# Patient Record
Sex: FEMALE | Race: WHITE | NOT HISPANIC OR LATINO | Employment: STUDENT | URBAN - METROPOLITAN AREA
[De-identification: names, ages, dates, MRNs, and addresses within clinical notes are randomized per-mention and may not be internally consistent; named-entity substitution may affect disease eponyms.]

---
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
## 2022-02-23 ENCOUNTER — OFFICE VISIT (OUTPATIENT)
Dept: GASTROENTEROLOGY | Facility: CLINIC | Age: 22
End: 2022-02-23
Payer: COMMERCIAL

## 2022-02-23 ENCOUNTER — TELEPHONE (OUTPATIENT)
Dept: GASTROENTEROLOGY | Facility: CLINIC | Age: 22
End: 2022-02-23

## 2022-02-23 VITALS
SYSTOLIC BLOOD PRESSURE: 108 MMHG | DIASTOLIC BLOOD PRESSURE: 74 MMHG | WEIGHT: 135.2 LBS | HEIGHT: 61 IN | BODY MASS INDEX: 25.53 KG/M2

## 2022-02-23 DIAGNOSIS — R10.32 LEFT LOWER QUADRANT ABDOMINAL PAIN: ICD-10-CM

## 2022-02-23 DIAGNOSIS — R11.0 NAUSEA: Primary | ICD-10-CM

## 2022-02-23 PROCEDURE — 99204 OFFICE O/P NEW MOD 45 MIN: CPT | Performed by: INTERNAL MEDICINE

## 2022-02-23 RX ORDER — BUPROPION HYDROCHLORIDE 150 MG/1
TABLET ORAL
COMMUNITY
Start: 2022-02-16

## 2022-02-23 RX ORDER — LEVONORGESTREL AND ETHINYL ESTRADIOL 0.1-0.02MG
KIT ORAL
COMMUNITY
Start: 2022-01-28

## 2022-02-23 RX ORDER — NAPROXEN 500 MG/1
500 TABLET ORAL 2 TIMES DAILY PRN
COMMUNITY
Start: 2021-12-31

## 2022-02-23 RX ORDER — ONDANSETRON 4 MG/1
4 TABLET, ORALLY DISINTEGRATING ORAL EVERY 6 HOURS PRN
Qty: 30 TABLET | Refills: 5 | Status: SHIPPED | OUTPATIENT
Start: 2022-02-23

## 2022-02-23 RX ORDER — DEXTROAMPHETAMINE SACCHARATE, AMPHETAMINE ASPARTATE MONOHYDRATE, DEXTROAMPHETAMINE SULFATE AND AMPHETAMINE SULFATE 6.25; 6.25; 6.25; 6.25 MG/1; MG/1; MG/1; MG/1
25 CAPSULE, EXTENDED RELEASE ORAL EVERY MORNING
COMMUNITY

## 2022-02-23 RX ORDER — ONDANSETRON 4 MG/1
TABLET, ORALLY DISINTEGRATING ORAL
COMMUNITY
Start: 2021-12-31 | End: 2022-02-23 | Stop reason: SDUPTHER

## 2022-02-23 NOTE — H&P (VIEW-ONLY)
[facility] - Outpatient Consultation  Arely Garcia 20 y o  female MRN: [id number]  Encounter: [id number]    ASSESSMENT AND PLAN:      1  Left lower quadrant abdominal pain  Bouts of abdominal pain began about 18 months ago  No food triggers  No associated change in bowel habits  No relief with dicyclomine  Had appendectomy and lysis of adhesions in December, now pain is more left-sided  No etiology on CT    Discussed treatment options  Plan colonoscopy to assess for IBD  Differential also includes functional abdominal pain or endometriosis  Continue dicyclomine as needed preprocedure    2  Nausea  Longstanding complaint  May be related to POTS  Good control with Zofran when she received after appendectomy, refill provided      Followup Appointment:  Pending colonoscopy  ______________________________________________________________________    Chief Complaint   Patient presents with    Abdominal Pain       HPI:   Arely Garcia is a 20y o  year old female who presents for consultation regarding abdominal pain  Symptoms began about 18 months ago  Symptoms are severe and lasted a few weeks  She was seen in urgent care and was diagnosed with IBS  She was provided dicyclomine as needed provided little relief  Symptoms improved within a few weeks  Symptoms recurred in December  They are more localized to the right side  CT showed appendicoliths  She underwent appendectomy and lysis of adhesions  She recovered from the procedure that about 2 weeks later she developed severe left-sided abdominal pain   She was seen in the ER twice and and was referred to GI where she lives in [place]  Symptoms improved in about 2 weeks with supportive care  Colonoscopy was planned but rescheduled multiple times due to insurance issues  Symptoms are starting to recur on the left side  The abdomen flow throughout the day  There are no specific food triggers    Stools are generally normal with occasional constipation if she is not well hydrated  She denies any rectal bleeding or mucus  The pain occasionally radiates to the left flank  She denies any urinary complaints  Her weight is generally stable    Historical Information   Past Medical History:   Diagnosis Date    ADHD     Amplified musculoskeletal pain syndrome     Anxiety     Depression     Migraines     POTS (postural orthostatic tachycardia syndrome)      Past Surgical History:   Procedure Laterality Date    APPENDECTOMY      TONSILECTOMY AND ADNOIDECTOMY      WISDOM TOOTH EXTRACTION       Social History     Substance and Sexual Activity   Alcohol Use Yes    Comment: occasionally     Social History     Substance and Sexual Activity   Drug Use Not on file     Social History     Tobacco Use   Smoking Status Never Smoker   Smokeless Tobacco Never Used     Family History   Problem Relation Age of Onset    Fibromyalgia Mother     Hypertension Mother     Ulcerative colitis Mother    Kimberly Congress Migraines Mother     Depression Mother     Colon cancer Mother     Ulcers Brother     No Known Problems Brother        Meds/Allergies     Current Outpatient Medications:     amphetamine-dextroamphetamine (ADDERALL XR) 25 MG 24 hr capsule    buPROPion (WELLBUTRIN XL) 150 mg 24 hr tablet    naproxen (NAPROSYN) 500 mg tablet    ondansetron (ZOFRAN-ODT) 4 mg disintegrating tablet    Sronyx 0 1-20 MG-MCG per tablet    Allergies   Allergen Reactions    Codeine Swelling       PHYSICAL EXAM:    Blood pressure 108/74, height 5' 1" (1 549 m), weight 61 3 kg (135 lb 3 2 oz)  Body mass index is 25 55 kg/m²  General Appearance: NAD, cooperative, alert  Eyes: Anicteric, PERRLA, EOMI  ENT:  Normocephalic, atraumatic, normal mucosa      Neck:  Supple, symmetrical, trachea midline,   Resp:  Clear to auscultation bilaterally; no rales, rhonchi or wheezing; respirations unlabored   CV:  S1 S2, Regular rate and rhythm; no murmur, rub, or gallop  GI:  Soft, non-tender, non-distended; normal bowel sounds; no masses, no organomegaly   Rectal: Deferred  Musculoskeletal: No cyanosis, clubbing or edema  Normal ROM  Skin:  No jaundice, rashes, or lesions   Heme/Lymph: No palpable cervical lymphadenopathy  Psych: Normal affect, good eye contact  Neuro: No gross deficits, AAOx3    Lab Results:   No results found for: WBC, HGB, HCT, MCV, PLT  No results found for: NA, K, CL, CO2, ANIONGAP, BUN, CREATININE, GLUCOSE, GLUF, CALCIUM, CORRECTEDCA, AST, ALT, ALKPHOS, PROT, BILITOT, EGFR  No results found for: IRON, TIBC, FERRITIN  No results found for: LIPASE    Radiology Results:   No results found  REVIEW OF SYSTEMS:    CONSTITUTIONAL: Denies any fever, chills, rigors, and weight loss  HEENT: No earache or tinnitus  Denies hearing loss or visual disturbances  CARDIOVASCULAR: No chest pain or palpitations  RESPIRATORY: Denies any cough, hemoptysis, shortness of breath or dyspnea on exertion  GASTROINTESTINAL: As noted in the History of Present Illness  GENITOURINARY: No problems with urination  Denies any hematuria or dysuria  NEUROLOGIC: No dizziness or vertigo, denies headaches  MUSCULOSKELETAL: Denies any muscle or joint pain  SKIN: Denies skin rashes or itching  ENDOCRINE: Denies excessive thirst  Denies intolerance to heat or cold  PSYCHOSOCIAL: Denies depression or anxiety  Denies any recent memory loss

## 2022-02-23 NOTE — PROGRESS NOTES
[facility] - Outpatient Consultation  Isabelle Garcia 20 y o  female MRN: [id number]  Encounter: [id number]    ASSESSMENT AND PLAN:      1  Left lower quadrant abdominal pain  Bouts of abdominal pain began about 18 months ago  No food triggers  No associated change in bowel habits  No relief with dicyclomine  Had appendectomy and lysis of adhesions in December, now pain is more left-sided  No etiology on CT    Discussed treatment options  Plan colonoscopy to assess for IBD  Differential also includes functional abdominal pain or endometriosis  Continue dicyclomine as needed preprocedure    2  Nausea  Longstanding complaint  May be related to POTS  Good control with Zofran when she received after appendectomy, refill provided      Followup Appointment:  Pending colonoscopy  ______________________________________________________________________    Chief Complaint   Patient presents with    Abdominal Pain       HPI:   Isabelle Garcia is a 20y o  year old female who presents for consultation regarding abdominal pain  Symptoms began about 18 months ago  Symptoms are severe and lasted a few weeks  She was seen in urgent care and was diagnosed with IBS  She was provided dicyclomine as needed provided little relief  Symptoms improved within a few weeks  Symptoms recurred in December  They are more localized to the right side  CT showed appendicoliths  She underwent appendectomy and lysis of adhesions  She recovered from the procedure that about 2 weeks later she developed severe left-sided abdominal pain   She was seen in the ER twice and and was referred to GI where she lives in [facility]  Symptoms improved in about 2 weeks with supportive care  Colonoscopy was planned but rescheduled multiple times due to insurance issues  Symptoms are starting to recur on the left side  The abdomen flow throughout the day  There are no specific food triggers    Stools are generally normal with occasional constipation if she is not well hydrated  She denies any rectal bleeding or mucus  The pain occasionally radiates to the left flank  She denies any urinary complaints  Her weight is generally stable    Historical Information   Past Medical History:   Diagnosis Date    ADHD     Amplified musculoskeletal pain syndrome     Anxiety     Depression     Migraines     POTS (postural orthostatic tachycardia syndrome)      Past Surgical History:   Procedure Laterality Date    APPENDECTOMY      TONSILECTOMY AND ADNOIDECTOMY      WISDOM TOOTH EXTRACTION       Social History     Substance and Sexual Activity   Alcohol Use Yes    Comment: occasionally     Social History     Substance and Sexual Activity   Drug Use Not on file     Social History     Tobacco Use   Smoking Status Never Smoker   Smokeless Tobacco Never Used     Family History   Problem Relation Age of Onset    Fibromyalgia Mother     Hypertension Mother     Ulcerative colitis Mother    Leandro Cook Migraines Mother     Depression Mother     Colon cancer Mother     Ulcers Brother     No Known Problems Brother        Meds/Allergies     Current Outpatient Medications:     amphetamine-dextroamphetamine (ADDERALL XR) 25 MG 24 hr capsule    buPROPion (WELLBUTRIN XL) 150 mg 24 hr tablet    naproxen (NAPROSYN) 500 mg tablet    ondansetron (ZOFRAN-ODT) 4 mg disintegrating tablet    Sronyx 0 1-20 MG-MCG per tablet    Allergies   Allergen Reactions    Codeine Swelling       PHYSICAL EXAM:    Blood pressure 108/74, height 5' 1" (1 549 m), weight 61 3 kg (135 lb 3 2 oz)  Body mass index is 25 55 kg/m²  General Appearance: NAD, cooperative, alert  Eyes: Anicteric, PERRLA, EOMI  ENT:  Normocephalic, atraumatic, normal mucosa      Neck:  Supple, symmetrical, trachea midline,   Resp:  Clear to auscultation bilaterally; no rales, rhonchi or wheezing; respirations unlabored   CV:  S1 S2, Regular rate and rhythm; no murmur, rub, or gallop  GI:  Soft, non-tender, non-distended; normal bowel sounds; no masses, no organomegaly   Rectal: Deferred  Musculoskeletal: No cyanosis, clubbing or edema  Normal ROM  Skin:  No jaundice, rashes, or lesions   Heme/Lymph: No palpable cervical lymphadenopathy  Psych: Normal affect, good eye contact  Neuro: No gross deficits, AAOx3    Lab Results:   No results found for: WBC, HGB, HCT, MCV, PLT  No results found for: NA, K, CL, CO2, ANIONGAP, BUN, CREATININE, GLUCOSE, GLUF, CALCIUM, CORRECTEDCA, AST, ALT, ALKPHOS, PROT, BILITOT, EGFR  No results found for: IRON, TIBC, FERRITIN  No results found for: LIPASE    Radiology Results:   No results found  REVIEW OF SYSTEMS:    CONSTITUTIONAL: Denies any fever, chills, rigors, and weight loss  HEENT: No earache or tinnitus  Denies hearing loss or visual disturbances  CARDIOVASCULAR: No chest pain or palpitations  RESPIRATORY: Denies any cough, hemoptysis, shortness of breath or dyspnea on exertion  GASTROINTESTINAL: As noted in the History of Present Illness  GENITOURINARY: No problems with urination  Denies any hematuria or dysuria  NEUROLOGIC: No dizziness or vertigo, denies headaches  MUSCULOSKELETAL: Denies any muscle or joint pain  SKIN: Denies skin rashes or itching  ENDOCRINE: Denies excessive thirst  Denies intolerance to heat or cold  PSYCHOSOCIAL: Denies depression or anxiety  Denies any recent memory loss

## 2022-02-23 NOTE — TELEPHONE ENCOUNTER
Scheduled date of colonoscopy (as of today): 03/08/2022  Physician performing colonoscopy: Ayaka Balderas  Location of colonoscopy: [facility] ([facility]  Bowel prep reviewed with patient: Miralax  Instructions reviewed with patient by: Toma Rashid  Clearances:

## 2022-03-08 ENCOUNTER — ANESTHESIA EVENT (OUTPATIENT)
Dept: GASTROENTEROLOGY | Facility: AMBULATORY SURGERY CENTER | Age: 22
End: 2022-03-08

## 2022-03-08 ENCOUNTER — ANESTHESIA (OUTPATIENT)
Dept: GASTROENTEROLOGY | Facility: AMBULATORY SURGERY CENTER | Age: 22
End: 2022-03-08

## 2022-03-08 ENCOUNTER — HOSPITAL ENCOUNTER (OUTPATIENT)
Dept: GASTROENTEROLOGY | Facility: AMBULATORY SURGERY CENTER | Age: 22
Discharge: HOME | End: 2022-03-08
Payer: COMMERCIAL

## 2022-03-08 VITALS
WEIGHT: 132 LBS | OXYGEN SATURATION: 95 % | TEMPERATURE: 97.3 F | HEIGHT: 61 IN | DIASTOLIC BLOOD PRESSURE: 58 MMHG | BODY MASS INDEX: 24.92 KG/M2 | HEART RATE: 85 BPM | RESPIRATION RATE: 23 BRPM | SYSTOLIC BLOOD PRESSURE: 102 MMHG

## 2022-03-08 DIAGNOSIS — R10.32 LEFT LOWER QUADRANT ABDOMINAL PAIN: ICD-10-CM

## 2022-03-08 LAB
EXT PREGNANCY TEST URINE: NEGATIVE
EXT. CONTROL: NORMAL

## 2022-03-08 PROCEDURE — 45385 COLONOSCOPY W/LESION REMOVAL: CPT | Performed by: INTERNAL MEDICINE

## 2022-03-08 PROCEDURE — 45380 COLONOSCOPY AND BIOPSY: CPT | Performed by: INTERNAL MEDICINE

## 2022-03-08 RX ORDER — SODIUM CHLORIDE, SODIUM LACTATE, POTASSIUM CHLORIDE, CALCIUM CHLORIDE 600; 310; 30; 20 MG/100ML; MG/100ML; MG/100ML; MG/100ML
50 INJECTION, SOLUTION INTRAVENOUS CONTINUOUS
Status: DISCONTINUED | OUTPATIENT
Start: 2022-03-08 | End: 2022-03-12 | Stop reason: HOSPADM

## 2022-03-08 RX ORDER — PROPOFOL 10 MG/ML
INJECTION, EMULSION INTRAVENOUS AS NEEDED
Status: DISCONTINUED | OUTPATIENT
Start: 2022-03-08 | End: 2022-03-08

## 2022-03-08 RX ADMIN — PROPOFOL 50 MG: 10 INJECTION, EMULSION INTRAVENOUS at 14:35

## 2022-03-08 RX ADMIN — PROPOFOL 50 MG: 10 INJECTION, EMULSION INTRAVENOUS at 14:42

## 2022-03-08 RX ADMIN — PROPOFOL 50 MG: 10 INJECTION, EMULSION INTRAVENOUS at 14:40

## 2022-03-08 RX ADMIN — PROPOFOL 50 MG: 10 INJECTION, EMULSION INTRAVENOUS at 14:33

## 2022-03-08 RX ADMIN — SODIUM CHLORIDE, SODIUM LACTATE, POTASSIUM CHLORIDE, CALCIUM CHLORIDE 50 ML/HR: 600; 310; 30; 20 INJECTION, SOLUTION INTRAVENOUS at 14:12

## 2022-03-08 RX ADMIN — PROPOFOL 50 MG: 10 INJECTION, EMULSION INTRAVENOUS at 14:39

## 2022-03-08 RX ADMIN — PROPOFOL 150 MG: 10 INJECTION, EMULSION INTRAVENOUS at 14:31

## 2022-03-08 RX ADMIN — PROPOFOL 50 MG: 10 INJECTION, EMULSION INTRAVENOUS at 14:34

## 2022-03-08 RX ADMIN — PROPOFOL 50 MG: 10 INJECTION, EMULSION INTRAVENOUS at 14:37

## 2022-03-08 NOTE — DISCHARGE INSTRUCTIONS
Colonoscopy   WHAT YOU NEED TO KNOW:   A colonoscopy is a procedure to examine the inside of your colon (intestine) with a scope  Polyps or tissue growths may have been removed during your colonoscopy  It is normal to feel bloated and to have some abdominal discomfort  You should be passing gas  If you have hemorrhoids or you had polyps removed, you may have a small amount of bleeding  DISCHARGE INSTRUCTIONS:   Seek care immediately if:    You have sudden, severe abdominal pain   You have problems swallowing   You have a large amount of black, sticky bowel movements or blood in your bowel movements   You have sudden trouble breathing   You feel weak, lightheaded, or faint or your heart beats faster than normal for you  Contact your healthcare provider if:    You have a fever and chills   You have nausea or are vomiting   Your abdomen is bloated or feels full and hard   You have abdominal pain   You have black, sticky bowel movements or blood in your bowel movements   You have not had a bowel movement for 3 days after your procedure   You have rash or hives   You have questions or concerns about your procedure  Activity:    Do not lift, strain, or run for 24 hours after your procedure   Rest after your procedure  You have been given medicine to relax you  Do not drive or make important decisions until the day after your procedure  Return to your normal activity as directed   Relieve gas and discomfort from bloating by lying on your right side with a heating pad on your abdomen  You may need to take short walks to help the gas move out  Eat small meals until bloating is relieved  Follow up with your healthcare provider as directed: Write down your questions so you remember to ask them during your visits  If you take a blood thinner, please review the specific instructions from your endoscopist about when you should resume it   These can be found in the Recommendation and Your Medication list sections of this After Visit Summary  Colorectal Polyps   WHAT YOU NEED TO KNOW:   What are colorectal polyps? Colorectal polyps are small growths of tissue in the lining of the colon and rectum  Most polyps are usually benign (not cancer)  Certain types of polyps, called adenomatous polyps, may turn into cancer  What increases my risk for colorectal polyps? The exact cause of colorectal polyps is unknown  The following may increase your risk:  · Older age    · Foods high in fat and low in fiber    · Family history of polyps    · Intestinal diseases, such as Crohn disease or ulcerative colitis    · Smoking cigarettes or drinking alcohol    · Lack of physical activity, such as exercise    · Obesity    What are the signs and symptoms of colorectal polyps? · Blood in your bowel movement or bleeding from the rectum    · Change in bowel movement habits, such as diarrhea or constipation    · Abdominal pain    What do I need to know about colorectal polyp screening and diagnosis? Screening means you are checked for polyps that may be cancer, even if you do not have signs or symptoms  Screening is recommended starting at age 48 and continuing to age 76 if you are at average risk  Your healthcare provider may suggest screening starting at age 39  Screening may start before you are 45 or continue after you are 75 if your risk is high  Your provider will tell you how often to get screened  Timing depends on the type of screening and if polyps are found  Timing also depends on your age and if you are at increased risk for cancer  Screening may be recommended every 1, 2, 5, or 10 years  Your healthcare provider will need to test polyps to find out if they are cancer  Any of the following may be used to find polyps:  · A digital rectal exam  means your provider will use a finger to check for polyps  · A barium enema  is an x-ray of the colon   A tube is put into your anus, and a liquid called barium is put through the tube  Barium is used so that healthcare providers can see your colon better on the x-ray film  · A virtual colonoscopy  is a CT scan that takes pictures of the inside of your colon and rectum  A small, flexible tube is put into your rectum and air or carbon dioxide (gas) is used to expand your colon  This lets healthcare providers clearly see your colon and any polyps on a monitor  · Colonoscopy or sigmoidoscopy  are procedures to help your provider see the inside of your colon  He or she will use a flexible tube with a small light and camera on the end  During a sigmoidoscopy, your provider will only look at rectum and lower colon  During a colonoscopy, he or she will look at the full length of your colon  A small amount of tissue may be removed from your colon for tests  How are colorectal polyps treated? Small, benign polyps may not need treatment  Your healthcare provider will check the polyp over time to make sure it is not changing  Polyps that are cancer may be removed with one of the following:  · A polypectomy  is a minimally invasive procedure to remove a polyp during a colonoscopy or sigmoidoscopy  Your healthcare provider may need to remove the polyp with a laparoscope  Laparoscopy is done by inserting a small, flexible scope into incisions made on your abdomen  · Surgery  may be needed to remove large or deep polyps that cannot be removed in a polypectomy  Tissues or lymph nodes near a polyp may also be removed  This helps stop cancer from spreading  What can I do to lower my risk for colorectal polyps? · Eat a variety of healthy foods  Healthy foods include fruit, vegetables, whole-grain breads, low-fat dairy products, beans, lean meat, and fish  Ask if you need to be on a special diet  · Maintain a healthy weight  Ask your healthcare provider what a healthy weight is for you   Ask him or her to help with a weight loss plan if you are overweight  · Exercise as directed  Begin to exercise slowly and do more as you get stronger  Talk with your healthcare provider before you start an exercise program          · Limit alcohol  Your risk for polyps increases the more you drink  · Do not smoke  Nicotine and other chemicals in cigarettes and cigars increase your risk for polyps  Ask your healthcare provider for information if you currently smoke and need help to quit  E-cigarettes or smokeless tobacco still contain nicotine  Talk to your healthcare provider before you use these products  Where can I find more information? · Dragan Banks ([facility])  [address], [place] [address]  Phone: [phone]  Web Address: Patricia Barreto  [facility] gov    Call your local emergency number (911 in the [address]) if:   · You have sudden shortness of breath  · You have a fast heart rate, fast breathing, or are too dizzy to stand up  When should I seek immediate care? · You have severe abdominal pain  · You see blood in your bowel movement  When should I call my doctor? · You have a fever  · You have chills, a cough, or feel weak and achy  · You have abdominal pain that does not go away or gets worse after you take medicine  · Your abdomen is swollen  · You are losing weight without trying  · You have questions or concerns about your condition or care  CARE AGREEMENT:   You have the right to help plan your care  Learn about your health condition and how it may be treated  Discuss treatment options with your healthcare providers to decide what care you want to receive  You always have the right to refuse treatment  The above information is an  only  It is not intended as medical advice for individual conditions or treatments   Talk to your doctor, nurse or pharmacist before following any medical regimen to see if it is safe and effective for you  © Copyright [employer] 2022 Information is for End User's use only and may not be sold, redistributed or otherwise used for commercial purposes   All illustrations and images included in CareNotes® are the copyrighted property of A D A M , Inc  or Akash Cabral

## 2022-03-08 NOTE — ANESTHESIA PREPROCEDURE EVALUATION
Procedure:  COLONOSCOPY    Relevant Problems   ANESTHESIA   (+) PONV (postoperative nausea and vomiting)      CARDIO (within normal limits)   (+) Migraines      GI/HEPATIC  appendectomy 12/2021 for chronic appendicitis      GYN   (-) Currently pregnant      NEURO/PSYCH   (+) Anxiety   (+) Depression   (+) Migraines      PULMONARY   (+) Asthma (Resolved) (childhood asthma - outgrown)   (+) Sleep apnea (s/p tonsillectomy  Spouse has reported apneas, she is doubtful still significant RITESH)   (-) Smoking   (-) URI (upper respiratory infection)      Other   (+) ADHD   (+) POTS (postural orthostatic tachycardia syndrome)      Physical Exam    Airway    Mallampati score: II  TM Distance: >3 FB  Neck ROM: full     Dental   No notable dental hx     Cardiovascular      Pulmonary      Other Findings        Anesthesia Plan  ASA Score- 2     Anesthesia Type- IV sedation with anesthesia with ASA Monitors  Additional Monitors:   Airway Plan:           Plan Factors-Exercise tolerance (METS): >4 METS  Chart reviewed  Existing labs reviewed  Patient summary reviewed  Patient is not a current smoker  Induction- intravenous  Postoperative Plan-     Informed Consent- Anesthetic plan and risks discussed with patient  I personally reviewed this patient with the CRNA  Discussed and agreed on the Anesthesia Plan with the ANTHONY Zimmer

## 2022-03-08 NOTE — INTERVAL H&P NOTE
H&P reviewed  After examining the patient I find no changes in the patients condition since the H&P had been written      Vitals:    03/08/22 1404   BP: 128/77   Pulse: 88   Resp: (!) 24   Temp:    SpO2: 99%

## 2022-03-08 NOTE — ANESTHESIA POSTPROCEDURE EVALUATION
Post-Op Assessment Note    CV Status:  Stable    Pain management: adequate     Mental Status:  Alert and awake   Hydration Status:  Euvolemic   PONV Controlled:  Controlled   Airway Patency:  Patent      Post Op Vitals Reviewed: Yes      Staff: CRNA, Anesthesiologist         No complications documented      BP   131/67   Temp     Pulse  67   Resp  15   SpO2   100

## 2022-03-22 ENCOUNTER — TELEPHONE (OUTPATIENT)
Dept: GASTROENTEROLOGY | Facility: CLINIC | Age: 22
End: 2022-03-22

## 2022-03-22 DIAGNOSIS — K52.3 INDETERMINATE COLITIS: Primary | ICD-10-CM

## 2022-03-22 NOTE — TELEPHONE ENCOUNTER
Patient left  wanting to know her biopsy results  She saw via the portal but doesn't understand, what's the next step? Portal message or telephone call is preferred    Callback # [phone]

## 2022-03-23 RX ORDER — MESALAMINE 1.2 G/1
2400 TABLET, DELAYED RELEASE ORAL
Qty: 60 TABLET | Refills: 2 | Status: SHIPPED | OUTPATIENT
Start: 2022-03-23 | End: 2022-06-15

## 2022-03-23 NOTE — TELEPHONE ENCOUNTER
Discussed with patient  Colon appeared normal, biopsies to assess for microscopic colitis showed mild chronic inflammation  Could represent very mild ulcerative colitis  Discussed treatment options  Will treat with low-dose mesalamine  Script sent to her pharmacy in [facility]  Please arrange office or virtual visit with me in 4-6 weeks

## 2022-03-23 NOTE — RESULT ENCOUNTER NOTE
Discussed with patient, no colonoscopy recall , will office visit 1 month  See phone encounter, electronically prescribed mesalamine

## 2022-03-23 NOTE — TELEPHONE ENCOUNTER
Pt calling for results  I noted receipt of mssg late yesterday/was forwarded by nurse to Dr Huertas Angry  I conf'd CB # above  Pt reports she still has abdominal pain-presently is #3, but movement makes worse/can go high enough she is unable to walk but no known trigger

## 2022-04-11 ENCOUNTER — TELEPHONE (OUTPATIENT)
Dept: GASTROENTEROLOGY | Facility: CLINIC | Age: 22
End: 2022-04-11

## 2022-04-11 DIAGNOSIS — K52.3 INDETERMINATE COLITIS: Primary | ICD-10-CM

## 2022-04-11 NOTE — TELEPHONE ENCOUNTER
Pt left message she was started on medication after colonoscopy but feels it is actually making her pain worse and has nausea  Wants to know what to do?

## 2022-04-12 RX ORDER — BUDESONIDE 3 MG/1
9 CAPSULE, COATED PELLETS ORAL DAILY
Qty: 90 CAPSULE | Refills: 2 | Status: SHIPPED | OUTPATIENT
Start: 2022-04-12 | End: 2022-06-11

## 2022-04-12 NOTE — TELEPHONE ENCOUNTER
Okay to stop mesalamine      I submitted a prescription for budesonide 9 mg daily, keep upcoming office visit

## 2022-05-25 ENCOUNTER — TELEMEDICINE (OUTPATIENT)
Dept: GASTROENTEROLOGY | Facility: CLINIC | Age: 22
End: 2022-05-25
Payer: COMMERCIAL

## 2022-05-25 VITALS — HEIGHT: 61 IN | WEIGHT: 123 LBS | BODY MASS INDEX: 23.22 KG/M2

## 2022-05-25 DIAGNOSIS — K52.3 INDETERMINATE COLITIS: Primary | ICD-10-CM

## 2022-05-25 PROCEDURE — 99214 OFFICE O/P EST MOD 30 MIN: CPT | Performed by: INTERNAL MEDICINE

## 2022-05-25 NOTE — PROGRESS NOTES
Virtual Regular Visit    Verification of patient location:    Patient is located in the following state in which I hold an active license [place]      Assessment/Plan:    Problem List Items Addressed This Visit    None     Visit Diagnoses     Indeterminate colitis    -  Primary    Relevant Orders    CBC    Comprehensive metabolic panel    C-reactive protein    Sedimentation rate, automated    TSH, 3rd generation with Free T4 reflex    Protime-INR    Celiac Disease Antibody Profile        1  Left lower quadrant abdominal pain  2  Indeterminate colitis  Colonoscopy in March for abdominal pain and loose stools showed normal mucosa  Random biopsies showed chronic inflammation suggestive of IBD  She had worsening symptoms with mesalamine  Symptoms improved significantly with budesonide  She has occasional abdominal cramping and no further diarrhea    Will check inflammatory markers and if they are normal, will start tapering budesonide  Okay to use dicyclomine as needed        2  Nausea  Improved    3  Easy bruising  Predates budesonide  Will check platelets and INR with upcoming labs       Reason for visit is   Chief Complaint   Patient presents with    Follow-up     Colonoscopy in march---medication followup; occasional left abdominal side pain (off and on)     Virtual Regular Visit        Encounter provider Tonia Valdes DO    Provider located at [place] [address]  [phone]      Recent Visits  No visits were found meeting these conditions  Showing recent visits within past 7 days and meeting all other requirements  Today's Visits  Date Type Provider Dept   05/25/22 Telemedicine Tonia Valdes DO [facility]   Showing today's visits and meeting all other requirements  Future Appointments  No visits were found meeting these conditions    Showing future appointments within next 150 days and meeting all other requirements       The patient was identified by name and date of birth  Mahesh Tellez was informed that this is a telemedicine visit and that the visit is being conducted through [facility], located within [facility] and patient was informed this is a secure, HIPAA-complaint platform  She agrees to proceed     My office door was closed  No one else was in the room  She acknowledged consent and understanding of privacy and security of the video platform  The patient has agreed to participate and understands they can discontinue the visit at any time  Patient is aware this is a billable service  Subjective  Mahesh Tellez is a 20 y o  female who presents for follow-up on colitis  She was last seen at the time of a colonoscopy in March  The mucosa appeared normal   Biopsies for microscopic colitis showed chronic inflammation  Mesalamine cause nausea  She was pleased to budesonide and is very happy with symptom improvement  Symptoms are not nearly severe and they occur much less frequently  They have not been severe enough to require dicyclomine  Symptoms are more of an annoyance and they have not limited her daily activities  She has no further diarrhea  She has a few days a month  There are no food triggers  There is no nausea or vomiting  She complains of some easier bruising over the past 4-6 months  She does have an active job but she feels that the bruising has been worse recently  Vitaly Pipe       HPI     Past Medical History:   Diagnosis Date    ADHD     Amplified musculoskeletal pain syndrome     Anxiety     Depression     Migraines     PONV (postoperative nausea and vomiting)     POTS (postural orthostatic tachycardia syndrome)     Sleep apnea        Past Surgical History:   Procedure Laterality Date    APPENDECTOMY      COLONOSCOPY      TONSILECTOMY AND ADNOIDECTOMY      WISDOM TOOTH EXTRACTION         Current Outpatient Medications   Medication Sig Dispense Refill    amphetamine-dextroamphetamine (ADDERALL XR) 25 MG 24 hr capsule Take 25 mg by mouth every morning      budesonide (ENTOCORT EC) 3 MG capsule Take 3 capsules (9 mg total) by mouth daily 90 capsule 2    buPROPion (WELLBUTRIN XL) 150 mg 24 hr tablet       mesalamine (LIALDA) 1 2 g EC tablet Take 2 tablets (2 4 g total) by mouth daily with breakfast 60 tablet 2    naproxen (NAPROSYN) 500 mg tablet Take 500 mg by mouth 2 (two) times a day as needed      ondansetron (ZOFRAN-ODT) 4 mg disintegrating tablet Take 1 tablet (4 mg total) by mouth every 6 (six) hours as needed for nausea or vomiting 30 tablet 5    sertraline (ZOLOFT) 50 mg tablet Take 50 mg by mouth in the morning      Sronyx 0 1-20 MG-MCG per tablet TAKE 1 TABLET BY MOUTH ONCE A DAY FOR 21 DAYS       No current facility-administered medications for this visit  Allergies   Allergen Reactions    Hazelnut (Filbert) Allergy Skin Test Other (See Comments)     Edema-airway    Codeine Swelling       Review of Systems  All other symptoms reviewed and are negative  Video Exam    Vitals:    05/25/22 1515   Weight: 55 8 kg (123 lb)   Height: 5' 1" (1 549 m)       Physical Exam   Physical Exam   Constitutional: oriented to person, place, and time  appears well-developed and well-nourished  HENT:   Head: Normocephalic  Eyes: EOM are normal    Neck: Normal range of motion  Pulmonary/Chest: Effort normal    Abdominal: Normal appearance  Musculoskeletal: Normal range of motion  Neurological: alert and oriented to person, place, and time  Psychiatric: normal mood and affect  behavior is normal  Judgment and thought content normal    I spent 8 minutes directly with the patient during this visit    VIRTUAL VISIT [facility] verbally agrees to participate in [employer]   Pt is aware that [employer] could be limited without vital signs or the ability to perform a full hands-on physical Radhames Smyth understands she or the provider may request at any time to terminate the video visit and request the patient to seek care or treatment in person

## 2022-05-27 ENCOUNTER — TELEPHONE (OUTPATIENT)
Dept: GASTROENTEROLOGY | Facility: CLINIC | Age: 22
End: 2022-05-27

## 2022-05-27 LAB
ALBUMIN SERPL-MCNC: 4.3 G/DL (ref 3.9–5)
ALBUMIN/GLOB SERPL: 1.4 {RATIO} (ref 1.2–2.2)
ALP SERPL-CCNC: 68 IU/L (ref 44–121)
ALT SERPL-CCNC: 14 IU/L (ref 0–32)
AST SERPL-CCNC: 15 IU/L (ref 0–40)
BILIRUB SERPL-MCNC: 0.2 MG/DL (ref 0–1.2)
BUN SERPL-MCNC: 14 MG/DL (ref 6–20)
BUN/CREAT SERPL: 14 (ref 9–23)
CALCIUM SERPL-MCNC: 9.8 MG/DL (ref 8.7–10.2)
CHLORIDE SERPL-SCNC: 102 MMOL/L (ref 96–106)
CO2 SERPL-SCNC: 25 MMOL/L (ref 20–29)
CREAT SERPL-MCNC: 0.99 MG/DL (ref 0.57–1)
CRP SERPL-MCNC: 6 MG/L (ref 0–10)
EGFR: 82 ML/MIN/1.73
ENDOMYSIUM IGA SER QL: NEGATIVE
ERYTHROCYTE [DISTWIDTH] IN BLOOD BY AUTOMATED COUNT: 12.1 % (ref 11.7–15.4)
ERYTHROCYTE [SEDIMENTATION RATE] IN BLOOD BY WESTERGREN METHOD: 5 MM/HR (ref 0–32)
GLIADIN PEPTIDE IGA SER-ACNC: 4 UNITS (ref 0–19)
GLIADIN PEPTIDE IGG SER-ACNC: 2 UNITS (ref 0–19)
GLOBULIN SER-MCNC: 3.1 G/DL (ref 1.5–4.5)
GLUCOSE SERPL-MCNC: 63 MG/DL (ref 65–99)
HCT VFR BLD AUTO: 42.4 % (ref 34–46.6)
HGB BLD-MCNC: 14.3 G/DL (ref 11.1–15.9)
IGA SERPL-MCNC: 238 MG/DL (ref 87–352)
INR PPP: 1 (ref 0.9–1.2)
MCH RBC QN AUTO: 31.8 PG (ref 26.6–33)
MCHC RBC AUTO-ENTMCNC: 33.7 G/DL (ref 31.5–35.7)
MCV RBC AUTO: 94 FL (ref 79–97)
PLATELET # BLD AUTO: 278 X10E3/UL (ref 150–450)
POTASSIUM SERPL-SCNC: 4.4 MMOL/L (ref 3.5–5.2)
PROT SERPL-MCNC: 7.4 G/DL (ref 6–8.5)
PROTHROMBIN TIME: 10.1 SEC (ref 9.1–12)
RBC # BLD AUTO: 4.5 X10E6/UL (ref 3.77–5.28)
SODIUM SERPL-SCNC: 140 MMOL/L (ref 134–144)
TSH SERPL DL<=0.005 MIU/L-ACNC: 1.4 UIU/ML (ref 0.45–4.5)
TTG IGA SER-ACNC: <2 U/ML (ref 0–3)
TTG IGG SER-ACNC: <2 U/ML (ref 0–5)
WBC # BLD AUTO: 4.5 X10E3/UL (ref 3.4–10.8)

## 2022-05-27 NOTE — TELEPHONE ENCOUNTER
Pt needs 3 month follow up with Dr Ashley Escalera [facility] for pt to cb and schedule  jg

## 2022-06-11 DIAGNOSIS — K52.3 INDETERMINATE COLITIS: ICD-10-CM

## 2022-06-11 RX ORDER — BUDESONIDE 3 MG/1
9 CAPSULE, COATED PELLETS ORAL DAILY
Qty: 90 CAPSULE | Refills: 2 | Status: SHIPPED | OUTPATIENT
Start: 2022-06-11 | End: 2022-09-09

## 2022-06-15 DIAGNOSIS — K52.3 INDETERMINATE COLITIS: ICD-10-CM

## 2022-06-15 RX ORDER — MESALAMINE 1.2 G/1
TABLET, DELAYED RELEASE ORAL
Qty: 60 TABLET | Refills: 2 | Status: SHIPPED | OUTPATIENT
Start: 2022-06-15

## 2022-07-11 ENCOUNTER — TELEPHONE (OUTPATIENT)
Dept: GASTROENTEROLOGY | Facility: CLINIC | Age: 22
End: 2022-07-11

## 2022-07-11 NOTE — TELEPHONE ENCOUNTER
Called pt back, she wanted to leave a message for Dr Nakul Preciado as he is trying to help her find a diagnosis for her abdominal pain  Yesterday morning she started with a red raised rash on her lower back, buttocks, thighs and a few on her arms  It ranged in size from "the size of my pinky finger tip to much smaller "  She had approximately 20-50 of them that were extremely itchy  She took an Allegra allergy last night and are completely gone today  She denies any new medications or changes in body soap, laundry detergents or lotions  She wanted Dr Nakul Preciado to be aware and wonders if related to her pain and bloating worsening this week  She is aware Dr Nakul Preciado is not in today and is willing to wait for him  Did instruct her to call back if symptoms return  Pt is agreeable  Pt is taking Budesonide 3 mg 3 daily and Zofran prn

## 2022-07-11 NOTE — TELEPHONE ENCOUNTER
Patients GI provider:  Dr Euceda Pair    Number to return call: [phone]    Reason for call: Pt calling stating she experienced a rash last night that went away in the morning  Pt would like to know if this might be related to anything that she has      Scheduled procedure/appointment date if applicable: Appt  1/54/86

## 2022-07-12 NOTE — TELEPHONE ENCOUNTER
Reviewed records  Doubt that transient rash is due to her GI complaints, especially since it improved rapidly with an antihistamine  She should contact us if symptoms recur and are more persistent  Otherwise we will discuss further at August office visit

## 2022-08-29 ENCOUNTER — TELEMEDICINE (OUTPATIENT)
Dept: GASTROENTEROLOGY | Facility: CLINIC | Age: 22
End: 2022-08-29
Payer: COMMERCIAL

## 2022-08-29 VITALS — BODY MASS INDEX: 23.98 KG/M2 | HEIGHT: 61 IN | WEIGHT: 127 LBS

## 2022-08-29 DIAGNOSIS — R11.0 NAUSEA: ICD-10-CM

## 2022-08-29 DIAGNOSIS — K52.3 INDETERMINATE COLITIS: ICD-10-CM

## 2022-08-29 PROCEDURE — 99214 OFFICE O/P EST MOD 30 MIN: CPT | Performed by: INTERNAL MEDICINE

## 2022-08-29 RX ORDER — ONDANSETRON 4 MG/1
4 TABLET, ORALLY DISINTEGRATING ORAL EVERY 6 HOURS PRN
Qty: 30 TABLET | Refills: 5 | Status: SHIPPED | OUTPATIENT
Start: 2022-08-29

## 2022-08-29 RX ORDER — BUDESONIDE 3 MG/1
3 CAPSULE, COATED PELLETS ORAL DAILY
Qty: 90 CAPSULE | Refills: 1 | Status: SHIPPED | OUTPATIENT
Start: 2022-08-29 | End: 2022-11-27

## 2022-08-29 NOTE — PROGRESS NOTES
Virtual Regular Visit    Verification of patient location:    Patient is located in the following state in which I hold an active license [place]      Assessment/Plan:      1  Left lower quadrant abdominal pain  2  Indeterminate colitis      Colonoscopy in March 2022 for abdominal pain and loose stools showed normal mucosa  Random biopsies showed chronic inflammation suggestive of IBD  She had worsening symptoms with mesalamine  Symptoms improved significantly with budesonide  Inflammatory markers were normal so we tapered off budesonide but symptoms recurred  Will resume 3 milligrams daily and she will contact me if symptoms fail to improve before her follow-up           2  Nausea  Episodic nausea every day or 2  Resolved rapidly with Zofran       Reason for visit is   Chief Complaint   Patient presents with    Follow up to labs, colonoscopy    Virtual Regular Visit        Encounter provider Silke Irene DO    Provider located at [place] [address]  [phone]      Recent Visits  No visits were found meeting these conditions  Showing recent visits within past 7 days and meeting all other requirements  Today's Visits  Date Type Provider Dept   08/29/22 Telemedicine Silke Irene DO [facility]   Showing today's visits and meeting all other requirements  Future Appointments  No visits were found meeting these conditions  Showing future appointments within next 150 days and meeting all other requirements       The patient was identified by name and date of birth  Natalie Dykes was informed that this is a telemedicine visit and that the visit is being conducted through [facility] and patient was informed this is a secure, HIPAA-complaint platform  She agrees to proceed     My office door was closed  No one else was in the room    She acknowledged consent and understanding of privacy and security of the video platform  The patient has agreed to participate and understands they can discontinue the visit at any time  Patient is aware this is a billable service  Subjective  Richelle Mckeon is a 20 y o  female who presents for follow-up on abdominal pain  She was last seen via [facility] in May  Symptoms have improved significantly with budesonide for chronic inflammation seen on colonoscopy biopsies  We did labs including inflammatory markers that were unremarkable  She reduced the budesonide to 6 milligrams for 1 month, then 3 milligrams for 1 month, then stopped about 3 weeks ago  Her symptoms have gradually recurred with daily issues with central abdominal bloating and achiness along with left-sided abdominal cramping  The cramping does not change with bowel movement  She typically has a firm bowel movement daily  She occasionally has to strain  There has been no rectal bleeding  She has nausea every few days that resolved rapidly with Zofran  She has had no vomiting  Her appetite is good and her weight is stable  [facility]     Past Medical History:   Diagnosis Date    ADHD     Amplified musculoskeletal pain syndrome     Anxiety     Depression     Migraines     PONV (postoperative nausea and vomiting)     POTS (postural orthostatic tachycardia syndrome)     Sleep apnea        Past Surgical History:   Procedure Laterality Date    APPENDECTOMY      COLONOSCOPY      TONSILECTOMY AND ADNOIDECTOMY      WISDOM TOOTH EXTRACTION         Current Outpatient Medications   Medication Sig Dispense Refill    amphetamine-dextroamphetamine (ADDERALL XR) 25 MG 24 hr capsule Take 25 mg by mouth every morning      budesonide (ENTOCORT EC) 3 MG capsule Take 1 capsule (3 mg total) by mouth daily 90 capsule 1    naproxen (NAPROSYN) 500 mg tablet Take 500 mg by mouth 2 (two) times a day as needed      ondansetron (ZOFRAN-ODT) 4 mg disintegrating tablet Take 1 tablet (4 mg total) by mouth every 6 (six) hours as needed for nausea or vomiting 30 tablet 5    sertraline (ZOLOFT) 50 mg tablet Take 50 mg by mouth in the morning      Sronyx 0 1-20 MG-MCG per tablet TAKE 1 TABLET BY MOUTH ONCE A DAY FOR 21 DAYS       No current facility-administered medications for this visit  Allergies   Allergen Reactions    Hazelnut (Filbert) Allergy Skin Test Other (See Comments)     Edema-airway    Codeine Swelling       Review of Systems  All other systems reviewed and are negative  Video Exam    Vitals:    08/29/22 1358   Weight: 57 6 kg (127 lb)   Height: 5' 1" (1 549 m)       Physical Exam   Physical Exam   Constitutional: oriented to person, place, and time  appears well-developed and well-nourished  HENT:   Head: Normocephalic  Eyes: EOM are normal    Neck: Normal range of motion  Pulmonary/Chest: Effort normal    Abdominal: Normal appearance  Musculoskeletal: Normal range of motion  Neurological: alert and oriented to person, place, and time  Psychiatric: normal mood and affect   behavior is normal  Judgment and thought content normal    I spent 9 minutes directly with the patient during this visit

## 2023-03-11 DIAGNOSIS — K52.3 INDETERMINATE COLITIS: ICD-10-CM

## 2023-03-13 RX ORDER — BUDESONIDE 3 MG/1
CAPSULE, COATED PELLETS ORAL
Qty: 30 CAPSULE | Refills: 5 | Status: SHIPPED | OUTPATIENT
Start: 2023-03-13